# Patient Record
Sex: MALE | Race: WHITE
[De-identification: names, ages, dates, MRNs, and addresses within clinical notes are randomized per-mention and may not be internally consistent; named-entity substitution may affect disease eponyms.]

---
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
## 2021-03-30 ENCOUNTER — APPOINTMENT (OUTPATIENT)
Dept: FAMILY MEDICINE | Facility: CLINIC | Age: 21
End: 2021-03-30
Payer: COMMERCIAL

## 2021-03-30 ENCOUNTER — TRANSCRIPTION ENCOUNTER (OUTPATIENT)
Age: 21
End: 2021-03-30

## 2021-03-30 VITALS
SYSTOLIC BLOOD PRESSURE: 140 MMHG | DIASTOLIC BLOOD PRESSURE: 80 MMHG | TEMPERATURE: 98 F | HEIGHT: 74 IN | BODY MASS INDEX: 28.11 KG/M2 | OXYGEN SATURATION: 99 % | WEIGHT: 219 LBS | HEART RATE: 79 BPM

## 2021-03-30 DIAGNOSIS — Z11.59 ENCOUNTER FOR SCREENING FOR OTHER VIRAL DISEASES: ICD-10-CM

## 2021-03-30 PROCEDURE — 99072 ADDL SUPL MATRL&STAF TM PHE: CPT

## 2021-03-30 PROCEDURE — 99203 OFFICE O/P NEW LOW 30 MIN: CPT

## 2021-03-30 NOTE — HEALTH RISK ASSESSMENT
[Yes] : Yes [Monthly or less (1 pt)] : Monthly or less (1 point) [With Family] : lives with family [# of Members in Household ___] :  household currently consist of [unfilled] member(s) [Employed] : employed [de-identified] : smoking ecig 15 minutes in total  [de-identified] : Mom, Dad, Sister  [FreeTextEntry2] : -full time [de-identified] : no glasses no contacts

## 2021-03-30 NOTE — PLAN
[FreeTextEntry1] : Repeat /72-continue to monitor; will recheck at annual CPE.\par Asked patient to get immunization records from pediatrician's office. \par \par Patient will go to lab for COVID swab. \par Stable exam; asymptomatic.\par \par Will follow-up lab results.\par Patient to return for CPE. \par

## 2021-03-30 NOTE — HISTORY OF PRESENT ILLNESS
[FreeTextEntry1] : Here to establish care; needs COVID screening for travel.  [de-identified] : Here to establish care; needs COVID screening for travel. \par -Hx of wrist surgery right wrist-age 17/18\par \par No significant family history. \par Transitioning from Pediatrician's office. \par Last summer was last bloodwork. \par \par No known COVID contacts. \par Willing be travelling internationally, requires COVID screening.\par Nervous in doctor's office today. \par \par \par

## 2021-04-01 ENCOUNTER — NON-APPOINTMENT (OUTPATIENT)
Age: 21
End: 2021-04-01

## 2021-04-01 LAB — SARS-COV-2 N GENE NPH QL NAA+PROBE: NOT DETECTED

## 2021-05-22 ENCOUNTER — TRANSCRIPTION ENCOUNTER (OUTPATIENT)
Age: 21
End: 2021-05-22

## 2022-04-11 PROBLEM — Z11.59 SCREENING FOR VIRAL DISEASE: Status: ACTIVE | Noted: 2021-03-30
